# Patient Record
Sex: FEMALE | Race: WHITE | URBAN - METROPOLITAN AREA
[De-identification: names, ages, dates, MRNs, and addresses within clinical notes are randomized per-mention and may not be internally consistent; named-entity substitution may affect disease eponyms.]

---

## 2020-12-23 ENCOUNTER — OFFICE VISIT (OUTPATIENT)
Dept: URBAN - METROPOLITAN AREA CLINIC 22 | Facility: CLINIC | Age: 60
End: 2020-12-23
Payer: COMMERCIAL

## 2020-12-23 PROCEDURE — 92133 CPTRZD OPH DX IMG PST SGM ON: CPT | Performed by: OPTOMETRIST

## 2020-12-23 PROCEDURE — 92004 COMPRE OPH EXAM NEW PT 1/>: CPT | Performed by: OPTOMETRIST

## 2020-12-23 ASSESSMENT — INTRAOCULAR PRESSURE
OD: 23
OS: 24

## 2020-12-23 NOTE — IMPRESSION/PLAN
Impression: Ischemic optic neuropathy, left eye: H47.012. Left. Pt spent several days in hospital, extensive testing performed, no treatment initiated by neurology at hospital. Plan: Discussed condition w/pt. Refer for consult w/retina specialist, soonest available.

## 2021-01-21 ENCOUNTER — OFFICE VISIT (OUTPATIENT)
Dept: URBAN - METROPOLITAN AREA CLINIC 33 | Facility: CLINIC | Age: 61
End: 2021-01-21
Payer: COMMERCIAL

## 2021-01-21 DIAGNOSIS — H47.012 ISCHEMIC OPTIC NEUROPATHY, LEFT EYE: Primary | ICD-10-CM

## 2021-01-21 PROCEDURE — 99204 OFFICE O/P NEW MOD 45 MIN: CPT | Performed by: OPHTHALMOLOGY

## 2021-01-21 PROCEDURE — 92134 CPTRZ OPH DX IMG PST SGM RTA: CPT | Performed by: OPHTHALMOLOGY

## 2021-01-21 ASSESSMENT — INTRAOCULAR PRESSURE
OD: 21
OS: 20

## 2021-01-21 NOTE — IMPRESSION/PLAN
Impression: Ischemic optic neuropathy, left eye: H47.012. Left. Vision: affected. Pt spent several days in hospital, extensive testing performed, no treatment initiated by neurology at hospital. Plan: Discussed diagnosis in detail with patient. Patient states all testing at hospital were normal (CT, Scan, blood work, spinal fluid), results showed no Brain Tumor, no  Giant Cell Arteritis, no MS. Dr. Lorie Urrutia reviewed patient's blood test results. Explained to patient that there might not be an explanation for her symptoms. Can consider additional testing or a consult with Neuro Ophthalmologist to further evaluate (referred to [facility]: Dr Kalpana Bocanegra or Dr. Eddy Vidal).

## 2021-03-05 ENCOUNTER — OFFICE VISIT (OUTPATIENT)
Dept: URBAN - METROPOLITAN AREA CLINIC 32 | Facility: CLINIC | Age: 61
End: 2021-03-05
Payer: COMMERCIAL

## 2021-03-05 PROCEDURE — 99214 OFFICE O/P EST MOD 30 MIN: CPT | Performed by: OPTOMETRIST

## 2021-03-05 ASSESSMENT — INTRAOCULAR PRESSURE
OD: 21
OS: 21

## 2021-03-05 ASSESSMENT — VISUAL ACUITY
OS: 20/60
OD: 20/20

## 2021-03-05 ASSESSMENT — KERATOMETRY
OS: 45.13
OD: 45.50

## 2021-03-05 NOTE — IMPRESSION/PLAN
Impression: Low vision, one eye, unspecified eye: H54.50. Plan: Recommend: Robi 4x Hand Magnifier, Robi Smart [employer], NIRAJ Lights(often available at [employer] or [facility]), Large TV 72'' or bigger, Audible Books Discussed: ORCAM, iPhone/iPad, Audible Assistants([employer] Keyanna/Google Home)

## 2021-03-05 NOTE — IMPRESSION/PLAN
Impression: Ischemic optic neuropathy, left eye: H47.012. Left. Vision: affected. 
Pt spent several days in hospital, extensive testing performed, no treatment initiated by neurology at hospital. Plan: cont with Dr Kurtis Hensley

## 2021-04-02 ENCOUNTER — OFFICE VISIT (OUTPATIENT)
Dept: URBAN - METROPOLITAN AREA CLINIC 32 | Facility: CLINIC | Age: 61
End: 2021-04-02
Payer: COMMERCIAL

## 2021-04-02 PROCEDURE — 99213 OFFICE O/P EST LOW 20 MIN: CPT | Performed by: OPTOMETRIST

## 2021-04-02 ASSESSMENT — INTRAOCULAR PRESSURE
OD: 23
OS: 23

## 2021-04-02 ASSESSMENT — KERATOMETRY
OD: 40.75
OS: 40.50

## 2021-04-02 NOTE — IMPRESSION/PLAN
Impression: Low vision, one eye, unspecified eye: H54.50. Plan: OK to return to work, may need work place accommodations in lighting and screen size. monitor work performance initially as some adaptation and adjustments may be needed. Recommend: Robi 4x Hand Magnifier, Robi Smart [employer], NIRAJ Lights(often available at [employer] or [employer]), Large TV 72'' or bigger, Audible Books Discussed: ORCAM, iPhone/iPad, Audible Assistants([employer] Keyanna/[employer] Home)

## 2022-04-01 ENCOUNTER — OFFICE VISIT (OUTPATIENT)
Dept: URBAN - METROPOLITAN AREA CLINIC 32 | Facility: CLINIC | Age: 62
End: 2022-04-01
Payer: COMMERCIAL

## 2022-04-01 DIAGNOSIS — H54.50 LOW VISION, ONE EYE, UNSPECIFIED EYE: ICD-10-CM

## 2022-04-01 DIAGNOSIS — H52.4 PRESBYOPIA: Primary | ICD-10-CM

## 2022-04-01 PROCEDURE — 92014 COMPRE OPH EXAM EST PT 1/>: CPT | Performed by: OPTOMETRIST

## 2022-04-01 ASSESSMENT — KERATOMETRY
OD: 45.63
OS: 45.13

## 2022-04-01 ASSESSMENT — INTRAOCULAR PRESSURE
OD: 14
OS: 14

## 2022-04-01 ASSESSMENT — VISUAL ACUITY
OS: 20/70
OD: 20/20

## 2022-04-01 NOTE — IMPRESSION/PLAN
Impression: Low vision, one eye, unspecified eye: H54.50. Plan: OK to return to work, may need work place accommodations in lighting and screen size. monitor work performance initially as some adaptation and adjustments may be needed. Recommend: Robi 4x Hand Magnifier, Robi Smart [employer], [employer]J Lights(often available at [employer] or [employer]), Large TV 72'' or bigger, Audible Books Discussed: ORCAM, iPhone/iPad, Audible Assistants([employer] Keyanna/[employer] Home)